# Patient Record
Sex: FEMALE | Race: WHITE | Employment: FULL TIME | URBAN - METROPOLITAN AREA
[De-identification: names, ages, dates, MRNs, and addresses within clinical notes are randomized per-mention and may not be internally consistent; named-entity substitution may affect disease eponyms.]

---

## 2021-09-19 ENCOUNTER — APPOINTMENT (OUTPATIENT)
Dept: GENERAL RADIOLOGY | Age: 61
End: 2021-09-19

## 2021-09-19 ENCOUNTER — HOSPITAL ENCOUNTER (EMERGENCY)
Age: 61
Discharge: HOME | End: 2021-09-19
Attending: EMERGENCY MEDICINE

## 2021-09-19 VITALS
DIASTOLIC BLOOD PRESSURE: 77 MMHG | BODY MASS INDEX: 22.96 KG/M2 | SYSTOLIC BLOOD PRESSURE: 108 MMHG | RESPIRATION RATE: 18 BRPM | HEIGHT: 69 IN | OXYGEN SATURATION: 98 % | TEMPERATURE: 97.6 F | WEIGHT: 155 LBS | HEART RATE: 75 BPM

## 2021-09-19 DIAGNOSIS — S52.501A CLOSED FRACTURE OF DISTAL END OF RIGHT RADIUS, UNSPECIFIED FRACTURE MORPHOLOGY, INITIAL ENCOUNTER: Primary | ICD-10-CM

## 2021-09-19 PROCEDURE — 6370000000 HC RX 637 (ALT 250 FOR IP): Performed by: NURSE PRACTITIONER

## 2021-09-19 PROCEDURE — 73100 X-RAY EXAM OF WRIST: CPT

## 2021-09-19 PROCEDURE — 73120 X-RAY EXAM OF HAND: CPT

## 2021-09-19 PROCEDURE — 6360000002 HC RX W HCPCS: Performed by: NURSE PRACTITIONER

## 2021-09-19 PROCEDURE — 25605 CLTX DST RDL FX/EPHYS SEP W/: CPT

## 2021-09-19 PROCEDURE — 73030 X-RAY EXAM OF SHOULDER: CPT

## 2021-09-19 PROCEDURE — 99282 EMERGENCY DEPT VISIT SF MDM: CPT

## 2021-09-19 PROCEDURE — 73070 X-RAY EXAM OF ELBOW: CPT

## 2021-09-19 PROCEDURE — 2580000003 HC RX 258: Performed by: NURSE PRACTITIONER

## 2021-09-19 PROCEDURE — 2500000003 HC RX 250 WO HCPCS: Performed by: NURSE PRACTITIONER

## 2021-09-19 RX ORDER — PROPOFOL 10 MG/ML
1 INJECTION, EMULSION INTRAVENOUS ONCE
Status: COMPLETED | OUTPATIENT
Start: 2021-09-19 | End: 2021-09-19

## 2021-09-19 RX ORDER — OXYCODONE HYDROCHLORIDE AND ACETAMINOPHEN 5; 325 MG/1; MG/1
2 TABLET ORAL ONCE
Status: COMPLETED | OUTPATIENT
Start: 2021-09-19 | End: 2021-09-19

## 2021-09-19 RX ORDER — BUPIVACAINE HYDROCHLORIDE 2.5 MG/ML
30 INJECTION, SOLUTION EPIDURAL; INFILTRATION; INTRACAUDAL ONCE
Status: COMPLETED | OUTPATIENT
Start: 2021-09-19 | End: 2021-09-19

## 2021-09-19 RX ORDER — OXYCODONE HYDROCHLORIDE AND ACETAMINOPHEN 5; 325 MG/1; MG/1
1-2 TABLET ORAL EVERY 6 HOURS PRN
Qty: 10 TABLET | Refills: 0 | Status: SHIPPED | OUTPATIENT
Start: 2021-09-19 | End: 2021-09-22

## 2021-09-19 RX ORDER — 0.9 % SODIUM CHLORIDE 0.9 %
1000 INTRAVENOUS SOLUTION INTRAVENOUS ONCE
Status: COMPLETED | OUTPATIENT
Start: 2021-09-19 | End: 2021-09-19

## 2021-09-19 RX ADMIN — OXYCODONE HYDROCHLORIDE AND ACETAMINOPHEN 2 TABLET: 5; 325 TABLET ORAL at 20:26

## 2021-09-19 RX ADMIN — SODIUM CHLORIDE 1000 ML: 0.9 INJECTION, SOLUTION INTRAVENOUS at 21:25

## 2021-09-19 RX ADMIN — BUPIVACAINE HYDROCHLORIDE 75 MG: 2.5 INJECTION, SOLUTION EPIDURAL; INFILTRATION; INTRACAUDAL; PERINEURAL at 21:32

## 2021-09-19 RX ADMIN — PROPOFOL 70 MG: 10 INJECTION, EMULSION INTRAVENOUS at 21:31

## 2021-09-19 RX ADMIN — LIDOCAINE HYDROCHLORIDE 5 ML: 10 INJECTION, SOLUTION EPIDURAL; INFILTRATION; INTRACAUDAL; PERINEURAL at 21:32

## 2021-09-19 ASSESSMENT — PAIN DESCRIPTION - ORIENTATION: ORIENTATION: RIGHT

## 2021-09-19 ASSESSMENT — PAIN DESCRIPTION - LOCATION: LOCATION: ARM;WRIST

## 2021-09-19 ASSESSMENT — PAIN SCALES - GENERAL
PAINLEVEL_OUTOF10: 10

## 2021-09-19 ASSESSMENT — PAIN DESCRIPTION - FREQUENCY: FREQUENCY: CONTINUOUS

## 2021-09-19 ASSESSMENT — PAIN DESCRIPTION - PAIN TYPE: TYPE: ACUTE PAIN

## 2021-09-19 ASSESSMENT — PAIN DESCRIPTION - ONSET: ONSET: SUDDEN

## 2021-09-19 NOTE — ED PROVIDER NOTES
[address] [facility] ENCOUNTER          NURSE PRACTITIONER NOTE       Date of evaluation: 9/19/2021    Chief Complaint     Arm Injury (fall of trailer trying to hang sign, denies any head or neck injury) and Wrist Injury (c/o right arm/wrist injury, numbness)      History of Present Illness     Adrian Smith is a 64 y.o. female with a past medical history as noted below; who presents to the emergency department with a complaint of a right arm pain/injury. She states that she was trying to hang a sign on a food truck when she fell off and landed on an outstretched right arm. Patient states that she is unable to move her arm, and feels that the entire arm is numb. She states that the pain seems to originate in her wrist and radiate up to her shoulder. However she is unable to move her hand, wrist, elbow or shoulder. She denies striking her head or loss of consciousness. Denies pain in her neck or pain anywhere else. Currently rates her pain an 8 out of 10, worse with attempts at moving; throbbing in nature. Review of Systems     Review of Systems   Constitutional: Negative. Musculoskeletal: Positive for arthralgias (right arm pain). Skin: Negative. Allergic/Immunologic: Negative for immunocompromised state. Neurological: Positive for numbness. Hematological: Does not bruise/bleed easily. Psychiatric/Behavioral: Negative. Past Medical, Surgical, Family, and Social History     She has a past medical history of CD (Crohn's disease) ([facility] [address] and Miscarriage. She has a past surgical history that includes Appendectomy. Her family history is not on file. She reports that she has never smoked. She has never used smokeless tobacco. She reports current alcohol use. She reports that she does not use drugs. Medications     Discharge Medication List as of 9/19/2021 10:26 PM          Allergies     She has No Known Allergies.     Physical Exam     INITIAL VITALS: BP: 123/78, Temp: 97.6 °F (36.4 °C), Pulse: 87, Resp: 20, SpO2: 100 %   Physical Exam  Vitals and nursing note reviewed. Constitutional:       Appearance: Normal appearance. Comments: Patient standing the room, holding arm extended out from her torso, appears to be in pain   HENT:      Head: Normocephalic and atraumatic. Cardiovascular:      Rate and Rhythm: Normal rate. Pulmonary:      Effort: Pulmonary effort is normal. No respiratory distress. Musculoskeletal:         General: Tenderness present. Comments: TTP of the right hand, wrist, elbow and wrist.  Deformity noted to ulnar aspect of wrist with intact distal pulses; brisk cap refill with sensation intact to light touch. Patient unable to range the wrist, elbow or shoulder   Skin:     General: Skin is warm and dry. Neurological:      Mental Status: She is alert and oriented to person, place, and time. Psychiatric:         Mood and Affect: Mood normal.         Behavior: Behavior normal.         Diagnostic Results       RADIOLOGY:  XR WRIST RIGHT (2 VIEWS)   Final Result      1. Status post closed reduction comminuted intra-articular impaction fracture of the distal radius and ulnar styloid fractures with near-anatomic bony alignment. XR SHOULDER RIGHT (MIN 2 VIEWS)   Final Result      1. No findings for acute traumatic bony abnormality. 3 VIEWS OF THE RIGHT SHOULDER      HISTORY: Fall with pain      FINDINGS: No fracture or dislocation. Humeral head remains within the glenoid fossa. AC joint is intact. No soft tissue normality identified. IMPRESSION:      1. No findings for acute traumatic bony abnormality within the right shoulder. 2 VIEWS OF THE RIGHT WRIST      HISTORY: Fall with pain      FINDINGS: There is comminuted intra-articular impaction fracture distal radius with dorsal displacement and angulation of the distal fracture fragment.   Ulnar positive variance in the wrist.  Fracture of the VIEWS OF THE RIGHT SHOULDER      HISTORY: Fall with pain      FINDINGS: No fracture or dislocation. Humeral head remains within the glenoid fossa. AC joint is intact. No soft tissue normality identified. IMPRESSION:      1. No findings for acute traumatic bony abnormality within the right shoulder. 2 VIEWS OF THE RIGHT WRIST      HISTORY: Fall with pain      FINDINGS: There is comminuted intra-articular impaction fracture distal radius with dorsal displacement and angulation of the distal fracture fragment. Ulnar positive variance in the wrist.  Fracture of the ulnar styloid. Carpocarpal joints are    maintained. Rounded soft tissue swelling in the wrist.      IMPRESSION:      1. Comminuted intra-articular impaction fracture distal radius with dorsal displacement and angulation of the distal fracture fragments. 2 VIEWS OF THE RIGHT HAND      HISTORY: Fall with pain      FINDINGS: Comminuted intra-articular impaction fracture distal radius with dorsal displacement and dorsal angulation of the distal fracture fragment. Ulnar positive variance in the wrist with ulnar styloid fracture. Remaining bones of the hand    otherwise intact. IMPRESSION:      1. Comminuted intra-articular impaction fracture distal radius with ulnar styloid fracture as described. No other acute bony and amount within the hand. LABS:   No results found for this visit on 09/19/21. RECENT VITALS:  BP: 108/77, Temp: 97.6 °F (36.4 °C), Pulse: 75, Resp: 18, SpO2: 98 %     Procedures     Closed reduction of right distal radial fracture, conscious sedation and hematoma block to right wrist; please see attending provider's note for procedure notes. ED Course     Nursing Notes, Past Medical Hx, Past Surgical Hx, Social Hx, Allergies, and Family Hx were reviewed.     The patient was given the following medications:  Orders Placed This Encounter   Medications    oxyCODONE-acetaminophen (PERCOCET) 5-325 MG per tablet 2 tablet    propofol injection 70 mg    lidocaine 1 % injection 5 mL    bupivacaine (PF) (MARCAINE) 0.25 % injection 75 mg    0.9 % sodium chloride bolus    oxyCODONE-acetaminophen (PERCOCET) 5-325 MG per tablet     Sig: Take 1-2 tablets by mouth every 6 hours as needed for Pain for up to 3 days. WARNING:  May cause drowsiness. May impair ability to operate vehicles or machinery. Do not use in combination with alcohol. Dispense:  10 tablet     Refill:  0            CONSULTS:  None    MEDICAL DECISION MAKING / ASSESSMENT / PLAN     Isidoro Cockayne is a 64 y.o. female who presents with complaints as noted in HPI. Patient presents with a complaint of right arm pain after mechanical fall off of a food truck. No head injury. She does have a deformity at her right wrist, with tenderness throughout hand, wrist, elbow and shoulder. Endorses numbness and tingling however has intact sensation on my exam.  X-rays of the hand, wrist, elbow and shoulder show a comminuted intra-articular impaction fracture of the distal radius with dorsal displacement and angulation of the distal fracture fragments. This was reduced as noted above, also please see attending providers note. Patient was premedicated with Percocet, IV hydration, and propofol was used for conscious sedation. Hematoma block was obtained with bupivacaine and lidocaine. Adequate reduction per post reduction films. Patient will be referred to Dr. Owen Stratton for follow-up, sugar tong splint was placed, she was given instructions on use. A short prescription of Percocet was provided for pain control. Patient was given strict return precautions as outlined in the AVS. Patient was agreeable and understanding to this plan of care. This patient was also evaluated by the attending physician. All care plans were discussed and agreed upon. Clinical Impression     1.  Closed fracture of distal end of right radius, unspecified fracture morphology, initial encounter        Disposition     PATIENT REFERRED TO:  Billi Severs, MD  [address]  [phone]      call for follow up appointment this week      DISCHARGE MEDICATIONS:  Discharge Medication List as of 9/19/2021 10:26 PM      START taking these medications    Details   oxyCODONE-acetaminophen (PERCOCET) 5-325 MG per tablet Take 1-2 tablets by mouth every 6 hours as needed for Pain for up to 3 days. WARNING:  May cause drowsiness. May impair ability to operate vehicles or machinery.   Do not use in combination with alcohol., Disp-10 tablet, R-0Print             DISPOSITION  Home in stable condition       YASMEEN Noble - PRAVEEN  09/22/21 1220

## 2021-09-19 NOTE — ED NOTES
Bed: B16-16  Expected date:   Expected time:   Means of arrival:   Comments:  Modesto Baumann 112, RN  09/19/21 1910

## 2021-09-20 NOTE — ED NOTES
Patient prepared for and ready to be discharged. Patient discharged at this time in no acute distress after verbalizing understanding of discharge instructions. Patient left after receiving After Visit Summary instructions.       Rocky Esparza RN  09/19/21 2223

## 2021-09-20 NOTE — ED PROVIDER NOTES
ED Attending Attestation Note     Date of evaluation: 9/19/2021    This patient was seen by the advance practice provider. I have seen and examined the patient, agree with the workup, evaluation, management and diagnosis. The care plan has been discussed. My assessment reveals patient presents complaining of right wrist pain after mechanical fall. Patient has obvious deformity to the wrist.  After verbal consent, procedural sedation and closed reduction was performed with improved alignment. Patient will be instructed to follow-up with orthopedic surgery.     Procedural sedation    Date/Time: 9/20/2021 5:44 AM  Performed by: Brandie Rehman MD  Authorized by: Brandie Rehman MD     Consent:     Consent obtained:  Written    Consent given by:  Patient  Indications:     Procedure performed:  Fracture reduction    Procedure necessitating sedation performed by:  Physician performing sedation    Intended level of sedation:  Moderate (conscious sedation)  Pre-sedation assessment:     Time since last food or drink:  6    ASA classification: class 1 - normal, healthy patient      Neck mobility: normal      Mouth opening:  3 or more finger widths    Mallampati score:  I - soft palate, uvula, fauces, pillars visible    History of difficult intubation: no      Pre-sedation assessment completed:  9/19/2021 8:30 PM  Immediate pre-procedure details:     Reassessment: Patient reassessed immediately prior to procedure      Reviewed: vital signs      Verified: bag valve mask available, emergency equipment available, intubation equipment available, IV patency confirmed, oxygen available and suction available    Procedure details (see MAR for exact dosages):     Sedation start time:  9/19/2021 9:29 PM    Sedation end time:  9/19/2021 9:40 PM    Total sedation time (minutes):  11  Post-procedure details:     Post-sedation assessment completed:  9/19/2021 11:20 PM    Attendance: Constant attendance by certified staff until patient recovered      Recovery: Patient returned to pre-procedure baseline      Patient is stable for discharge or admission: yes      Patient tolerance: Tolerated well, no immediate complications  Ortho Injury    Date/Time: 9/20/2021 5:48 AM  Performed by: Ryanne Manriquez MD  Authorized by: Ryanne Manriquez MD   Consent: Written consent obtained.   Patient identity confirmed: verbally with patient  Injury location: wrist  Location details: right wrist  Injury type: fracture  Fracture type: distal radius and ulnar styloid  Pre-procedure neurovascular assessment: neurovascularly intact  Anesthesia: hematoma block    Anesthesia:  Local anesthesia used: yes  Local Anesthetic: lidocaine 1% without epinephrine and bupivacaine 0.5% without epinephrine  Anesthetic total: 6 mL    Sedation:  Patient sedated: yes  Sedatives: propofol    Manipulation performed: yes  Skeletal traction used: no  Reduction successful: yes  X-ray confirmed reduction: yes  Immobilization: splint  Splint type: sugar tong  Post-procedure neurovascular assessment: post-procedure neurovascularly intact  Post-procedure distal perfusion: normal  Post-procedure neurological function: normal  Patient tolerance: patient tolerated the procedure well with no immediate complications            Ryanne Manriquez MD  09/20/21 [id number]